# Patient Record
Sex: MALE | URBAN - METROPOLITAN AREA
[De-identification: names, ages, dates, MRNs, and addresses within clinical notes are randomized per-mention and may not be internally consistent; named-entity substitution may affect disease eponyms.]

---

## 2021-06-01 ENCOUNTER — OFFICE VISIT (OUTPATIENT)
Dept: URBAN - METROPOLITAN AREA CLINIC 92 | Facility: CLINIC | Age: 61
End: 2021-06-01

## 2021-07-22 ENCOUNTER — HOSPITAL ENCOUNTER (INPATIENT)
Dept: HOSPITAL 5 - ED | Age: 61
LOS: 2 days | Discharge: HOME | DRG: 682 | End: 2021-07-24
Attending: INTERNAL MEDICINE | Admitting: INTERNAL MEDICINE
Payer: COMMERCIAL

## 2021-07-22 DIAGNOSIS — E03.9: ICD-10-CM

## 2021-07-22 DIAGNOSIS — E66.2: ICD-10-CM

## 2021-07-22 DIAGNOSIS — I50.31: ICD-10-CM

## 2021-07-22 DIAGNOSIS — E87.8: ICD-10-CM

## 2021-07-22 DIAGNOSIS — E87.5: ICD-10-CM

## 2021-07-22 DIAGNOSIS — Z83.3: ICD-10-CM

## 2021-07-22 DIAGNOSIS — I11.0: ICD-10-CM

## 2021-07-22 DIAGNOSIS — N17.0: Primary | ICD-10-CM

## 2021-07-22 DIAGNOSIS — E87.6: ICD-10-CM

## 2021-07-22 DIAGNOSIS — E78.2: ICD-10-CM

## 2021-07-22 DIAGNOSIS — I20.8: ICD-10-CM

## 2021-07-22 DIAGNOSIS — Z82.49: ICD-10-CM

## 2021-07-22 DIAGNOSIS — Z79.899: ICD-10-CM

## 2021-07-22 DIAGNOSIS — I21.A1: ICD-10-CM

## 2021-07-22 DIAGNOSIS — E87.1: ICD-10-CM

## 2021-07-22 LAB
ALBUMIN SERPL-MCNC: 4.1 G/DL (ref 3.9–5)
ALT SERPL-CCNC: 18 UNITS/L (ref 7–56)
APTT BLD: 31 SEC. (ref 24.2–36.6)
BASOPHILS # (AUTO): 0.1 K/MM3 (ref 0–0.1)
BASOPHILS NFR BLD AUTO: 0.7 % (ref 0–1.8)
BUN SERPL-MCNC: 13 MG/DL (ref 9–20)
BUN/CREAT SERPL: 8 %
CALCIUM SERPL-MCNC: 9.2 MG/DL (ref 8.4–10.2)
EOSINOPHIL # BLD AUTO: 0.1 K/MM3 (ref 0–0.4)
EOSINOPHIL NFR BLD AUTO: 0.6 % (ref 0–4.3)
HCT VFR BLD CALC: 36.7 % (ref 35.5–45.6)
HDLC SERPL-MCNC: 48 MG/DL (ref 40–59)
HEMOLYSIS INDEX: 8
HGB BLD-MCNC: 12.8 GM/DL (ref 11.8–15.2)
INR PPP: 1.03 (ref 0.87–1.13)
LYMPHOCYTES # BLD AUTO: 1.9 K/MM3 (ref 1.2–5.4)
LYMPHOCYTES NFR BLD AUTO: 15.4 % (ref 13.4–35)
MCHC RBC AUTO-ENTMCNC: 35 % (ref 32–34)
MCV RBC AUTO: 102 FL (ref 84–94)
MONOCYTES # (AUTO): 0.8 K/MM3 (ref 0–0.8)
MONOCYTES % (AUTO): 6.8 % (ref 0–7.3)
PLATELET # BLD: 407 K/MM3 (ref 140–440)
RBC # BLD AUTO: 3.61 M/MM3 (ref 3.65–5.03)

## 2021-07-22 PROCEDURE — 71046 X-RAY EXAM CHEST 2 VIEWS: CPT

## 2021-07-22 PROCEDURE — 83880 ASSAY OF NATRIURETIC PEPTIDE: CPT

## 2021-07-22 PROCEDURE — 85730 THROMBOPLASTIN TIME PARTIAL: CPT

## 2021-07-22 PROCEDURE — 80061 LIPID PANEL: CPT

## 2021-07-22 PROCEDURE — 93005 ELECTROCARDIOGRAM TRACING: CPT

## 2021-07-22 PROCEDURE — 36415 COLL VENOUS BLD VENIPUNCTURE: CPT

## 2021-07-22 PROCEDURE — 84484 ASSAY OF TROPONIN QUANT: CPT

## 2021-07-22 PROCEDURE — 71275 CT ANGIOGRAPHY CHEST: CPT

## 2021-07-22 PROCEDURE — 93458 L HRT ARTERY/VENTRICLE ANGIO: CPT

## 2021-07-22 PROCEDURE — 96374 THER/PROPH/DIAG INJ IV PUSH: CPT

## 2021-07-22 PROCEDURE — 85379 FIBRIN DEGRADATION QUANT: CPT

## 2021-07-22 PROCEDURE — 93306 TTE W/DOPPLER COMPLETE: CPT

## 2021-07-22 PROCEDURE — 85520 HEPARIN ASSAY: CPT

## 2021-07-22 PROCEDURE — 84439 ASSAY OF FREE THYROXINE: CPT

## 2021-07-22 PROCEDURE — C1894 INTRO/SHEATH, NON-LASER: HCPCS

## 2021-07-22 PROCEDURE — 85025 COMPLETE CBC W/AUTO DIFF WBC: CPT

## 2021-07-22 PROCEDURE — 85610 PROTHROMBIN TIME: CPT

## 2021-07-22 PROCEDURE — 80048 BASIC METABOLIC PNL TOTAL CA: CPT

## 2021-07-22 PROCEDURE — 80053 COMPREHEN METABOLIC PANEL: CPT

## 2021-07-22 PROCEDURE — 84443 ASSAY THYROID STIM HORMONE: CPT

## 2021-07-22 RX ADMIN — Medication SCH ML: at 23:10

## 2021-07-22 RX ADMIN — SODIUM CHLORIDE SCH MLS/HR: 0.9 INJECTION, SOLUTION INTRAVENOUS at 23:12

## 2021-07-22 RX ADMIN — FAMOTIDINE SCH MG: 10 TABLET ORAL at 23:10

## 2021-07-22 NOTE — EMERGENCY DEPARTMENT REPORT
ED Chest Pain HPI





- General


Chief Complaint: Chest Pain


Stated Complaint: CHEST PAIN/SOB


Time Seen by Provider: 07/22/21 13:08


Source: patient


Mode of arrival: Ambulatory


Limitations: No Limitations





- History of Present Illness


Initial Comments: 





60-year-old male, history of hyperthyroidism, hypertension, presents to ED with 

chest pain, radiating to the right arm.  Patient states pain awoke him from 

sleep earlier today.  He reports associated diaphoresis, nausea, and shortness 

of breath.  States pain is aching in nature.  Patient denies any leg pain or 

swelling.  Patient states chest pain has resolved at this time.


MD Complaint: chest pain


-: This morning


Onset: during rest


Pain Location: left chest, other


Pain Radiation: RUE


Severity: moderate


Quality: aching


Consistency: intermittent


Improves With: nothing


Worsens With: nothing


re: nausea, diaphoresis, dyspnea.  denies: vomting


Other Symptoms: denies: cough, fever, leg swelling





- Related Data


                                  Previous Rx's











 Medication  Instructions  Recorded  Last Taken  Type


 


Aspirin [Aspirin BABY CHEW TAB] 81 mg PO QDAY #30 tab.chew 07/24/21 Unknown Rx


 


AtorvaSTATin [Lipitor] 20 mg PO QHS #30 tablet 07/24/21 Unknown Rx


 


ISOSORBIDE MONOnitrate [Imdur ER] 30 mg PO QDAY #30 tablet 07/24/21 Unknown Rx


 


Losartan Potassium 100 mg PO QDAY #30 07/24/21 Unknown Rx


 


Losartan [Cozaar] 100 mg PO QDAY  tablet 07/24/21 Unknown Rx


 


Metoprolol [Lopressor TAB] 50 mg PO BID #60 tablet 07/24/21 Unknown Rx


 


NIFEdipine [Nifedipine ER] 60 mg PO QDAY #30 07/24/21 Unknown Rx


 


Omeprazole 40 mg PO QAM #30 07/24/21 Unknown Rx


 


buPROPion HCl [Bupropion HCl ER] 150 mg PO BID #60 07/24/21 Unknown Rx


 


buPROPion SR [Wellbutrin SR] 150 mg PO BID  tablet 07/24/21 Unknown Rx


 


hydroCHLOROthiazide [HCTZ] 25 mg PO QDAY #30 07/24/21 Unknown Rx


 


methIMAzole [Tapazole] 5 mg PO BID #60 07/24/21 Unknown Rx











                                    Allergies











Allergy/AdvReac Type Severity Reaction Status Date / Time


 


No Known Allergies Allergy   Unverified 07/22/21 11:38














Heart Score





- HEART Score


History: Moderately suspicious


EKG: Non-specific


Age: 45-65


Risk factors: 1-2 risk factors


Troponin: 1-3x normal limit


HEART Score: 5





- EKG Read Time


Time EKG Completed: 11:47


EKG Read Time: 11:54





ED Review of Systems


ROS: 


Stated complaint: CHEST PAIN/SOB


Other details as noted in HPI





Comment: All other systems reviewed and negative


Constitutional: denies: fever


Respiratory: shortness of breath.  denies: cough


Cardiovascular: chest pain


Gastrointestinal: nausea.  denies: vomiting


Musculoskeletal: other (Denies leg pain and swelling)





ED Past Medical Hx





- Past Medical History


Hx Hypertension: Yes





- Surgical History


Past Surgical History?: No





- Medications


Home Medications: 


                                Home Medications











 Medication  Instructions  Recorded  Confirmed  Last Taken  Type


 


Aspirin [Aspirin BABY CHEW TAB] 81 mg PO QDAY #30 tab.chew 07/24/21  Unknown Rx


 


AtorvaSTATin [Lipitor] 20 mg PO QHS #30 tablet 07/24/21  Unknown Rx


 


ISOSORBIDE MONOnitrate [Imdur ER] 30 mg PO QDAY #30 tablet 07/24/21  Unknown Rx


 


Losartan Potassium 100 mg PO QDAY #30 07/24/21  Unknown Rx


 


Losartan [Cozaar] 100 mg PO QDAY  tablet 07/24/21  Unknown Rx


 


Metoprolol [Lopressor TAB] 50 mg PO BID #60 tablet 07/24/21  Unknown Rx


 


NIFEdipine [Nifedipine ER] 60 mg PO QDAY #30 07/24/21  Unknown Rx


 


Omeprazole 40 mg PO QAM #30 07/24/21  Unknown Rx


 


buPROPion HCl [Bupropion HCl ER] 150 mg PO BID #60 07/24/21  Unknown Rx


 


buPROPion SR [Wellbutrin SR] 150 mg PO BID  tablet 07/24/21  Unknown Rx


 


hydroCHLOROthiazide [HCTZ] 25 mg PO QDAY #30 07/24/21  Unknown Rx


 


methIMAzole [Tapazole] 5 mg PO BID #60 07/24/21  Unknown Rx














ED Physical Exam





- General


Limitations: No Limitations


General appearance: alert, in no apparent distress





- Head


Head exam: Present: atraumatic, normocephalic





- Eye


Eye exam: Present: normal appearance, EOMI





- ENT


ENT exam: Present: mucous membranes moist





- Neck


Neck exam: Present: normal inspection





- Respiratory


Respiratory exam: Present: normal lung sounds bilaterally.  Absent: respiratory 

distress





- Cardiovascular


Cardiovascular Exam: Present: regular rate, normal rhythm





- GI/Abdominal


GI/Abdominal exam: Present: soft.  Absent: distended, tenderness





- Extremities Exam


Extremities exam: Present: normal inspection.  Absent: pedal edema, calf 

tenderness





- Neurological Exam


Neurological exam: Present: alert, oriented X3





- Psychiatric


Psychiatric exam: Present: normal affect, normal mood





- Skin


Skin exam: Present: warm, dry, intact, normal color





ED Course


                                   Vital Signs











  07/22/21 07/22/21 07/22/21





  11:43 12:57 13:00


 


Temperature 99.4 F  


 


Pulse Rate 95 H 98 H 91 H


 


Respiratory 20 22 27 H





Rate   


 


Blood Pressure 118/74  110/79


 


O2 Sat by Pulse 99 99 96





Oximetry   














  07/22/21 07/22/21 07/22/21





  13:16 13:30 13:46


 


Temperature   


 


Pulse Rate 94 H 108 H 95 H


 


Respiratory 16 19 23





Rate   


 


Blood Pressure 110/79 103/71 110/79


 


O2 Sat by Pulse 97 97 97





Oximetry   














  07/22/21 07/22/21 07/22/21





  14:00 14:16 14:30


 


Temperature   


 


Pulse Rate 85 90 86


 


Respiratory 24 22 26 H





Rate   


 


Blood Pressure 113/72 103/71 103/71


 


O2 Sat by Pulse 95 97 97





Oximetry   














  07/22/21 07/22/21 07/22/21





  14:46 15:00 15:16


 


Temperature   


 


Pulse Rate 86 82 


 


Respiratory 16 19 





Rate   


 


Blood Pressure 126/86 126/86 126/86


 


O2 Sat by Pulse 97 95 95





Oximetry   














  07/22/21 07/22/21 07/22/21





  15:30 15:46 16:00


 


Temperature   


 


Pulse Rate   


 


Respiratory   





Rate   


 


Blood Pressure 126/86 126/86 126/86


 


O2 Sat by Pulse 95 97 95





Oximetry   














  07/22/21 07/22/21 07/22/21





  16:16 16:30 16:46


 


Temperature   


 


Pulse Rate   


 


Respiratory   





Rate   


 


Blood Pressure 126/86 126/86 126/86


 


O2 Sat by Pulse 95 95 95





Oximetry   














  07/22/21 07/22/21 07/22/21





  17:00 17:16 17:30


 


Temperature   


 


Pulse Rate   


 


Respiratory   





Rate   


 


Blood Pressure 126/86 126/86 126/86


 


O2 Sat by Pulse 95 95 96





Oximetry   














  07/22/21 07/22/21 07/22/21





  17:46 18:00 18:16


 


Temperature   


 


Pulse Rate  83 93 H


 


Respiratory  24 28 H





Rate   


 


Blood Pressure 126/86 135/85 135/85


 


O2 Sat by Pulse 94 94 94





Oximetry   














  07/22/21 07/22/21 07/22/21





  18:30 18:46 19:00


 


Temperature   


 


Pulse Rate 86 98 H 85


 


Respiratory 26 H 15 27 H





Rate   


 


Blood Pressure 135/85 135/85 130/80


 


O2 Sat by Pulse 91 100 93





Oximetry   














  07/22/21 07/22/21 07/22/21





  19:16 19:30 19:46


 


Temperature   


 


Pulse Rate 65 98 H 83


 


Respiratory 26 H 15 29 H





Rate   


 


Blood Pressure 130/80 130/80 130/80


 


O2 Sat by Pulse 96 98 94





Oximetry   














  07/22/21 07/22/21 07/22/21





  20:00 20:16 20:30


 


Temperature   


 


Pulse Rate 91 H 78 67


 


Respiratory 18 28 H 22





Rate   


 


Blood Pressure 129/87 129/87 129/87


 


O2 Sat by Pulse 97 89 87





Oximetry   














  07/22/21 07/22/21 07/22/21





  20:46 21:00 21:16


 


Temperature   


 


Pulse Rate 86 81 87


 


Respiratory 29 H 20 20





Rate   


 


Blood Pressure 129/87 101/54 101/54


 


O2 Sat by Pulse 93 96 95





Oximetry   














  07/22/21 07/22/21 07/22/21





  21:30 21:46 22:00


 


Temperature   


 


Pulse Rate 75 75 89


 


Respiratory 23 22 27 H





Rate   


 


Blood Pressure 101/54 101/54 115/78


 


O2 Sat by Pulse 95 88 92





Oximetry   














  07/22/21 07/22/21 07/22/21





  22:16 22:30 22:34


 


Temperature   


 


Pulse Rate 93 H 87 78


 


Respiratory 19 16 18





Rate   


 


Blood Pressure 115/78 115/78 115/78


 


O2 Sat by Pulse 97 98 98





Oximetry   














  07/22/21





  22:40


 


Temperature 


 


Pulse Rate 86


 


Respiratory 15





Rate 


 


Blood Pressure 115/78


 


O2 Sat by Pulse 99





Oximetry 














ED Medical Decision Making





- Lab Data


Result diagrams: 


                                 07/24/21 04:26





                                 07/24/21 04:26





- EKG Data


-: EKG Interpreted by Me


EKG shows normal: sinus rhythm, axis, intervals, QRS complexes





- EKG Data


Interpretation: nonspecific ST-T wave jennifer





- Radiology Data


Radiology results: report reviewed, image reviewed


Critical care attestation.: 


If time is entered above; I have spent that time in minutes in the direct care 

of this critically ill patient, excluding procedure time.








ED Disposition


Clinical Impression: 


 Acute chest pain, Hyponatremia, Hypokalemia, Elevated troponin





Disposition: DC-09 OP ADMIT IP TO THIS HOSP


Is pt being admited?: Yes


Condition: Stable


Time of Disposition: 15:52

## 2021-07-22 NOTE — XRAY REPORT
CHEST 2 VIEWS 



INDICATION / CLINICAL INFORMATION:

Chest pain with shortness of breath.



COMPARISON: 

None available.



FINDINGS:



SUPPORT DEVICES: None.

HEART / MEDIASTINUM: No significant abnormality. 

LUNGS / PLEURA: A calcified granuloma is noted along the right lung base measuring 8.5 mm. The lungs 
are otherwise clear. No significant pleural effusion. No pneumothorax. 



ADDITIONAL FINDINGS: No significant additional findings.



IMPRESSION:

1. No acute abnormality of the chest.



Signer Name: Daniel Rajput MD 

Signed: 7/22/2021 12:08 PM

Workstation Name: [id number]

## 2021-07-22 NOTE — HISTORY AND PHYSICAL REPORT
History of Present Illness


Chief complaint: 





My chest hurts


History of present illness: 


59 YO Male with HTN, Hypothyroidism, Obesity Hypoventilation Syndrome presents 

to ED for evaluation.  Patient reports "my chest hurts".  Patient states that he

has experienced sudden onset of chest pain that awakened him from sleep today.  

Patient states that pain is 6/10, initially intermittent but has become more 

constant, aching and crushing in nature, radiates to the left chest, worsened 

with exertion, not relieved with rest, associated with nausea, associated with 

diaphoresis, associated with shortness of breath.  Patient transported to [facility] 

via private vehicle for further care and evaluation of the aforementioned sympto

ms.  The patient was seen and evaluated in the emergency department.  All lab 

and imaging studies reviewed.  Patient found to have angina at rest, acute 

kidney injury, as well as clinical symptoms consistent with diastolic CHF, as 

well as laboratory findings consistent with type II NSTEMI.  Patient admitted to

telemetry and initiated on chest pain protocol.  Cardiology team consulted in 

ED.  Patient denies fever, chills, palpitations, productive cough, skin rash, 

recent ill contact, or known exposure to COVID-19.  No prior admission for 

review.  No medication listed at time of admission for reconciliation.  Advanced

care planning conducted in ED.








Past History


Past Medical History: hypertension, hypothyroidism


Past Surgical History: No surgical history, Other (Reviewed)


Social history: single.  denies: smoking, alcohol abuse, prescription drug abuse


Family history: diabetes, hypertension





Medications and Allergies


                                    Allergies











Allergy/AdvReac Type Severity Reaction Status Date / Time


 


No Known Allergies Allergy   Unverified 07/22/21 11:38











                                Home Medications











 Medication  Instructions  Recorded  Confirmed  Last Taken  Type


 


AtorvaSTATin [Lipitor] 20 mg PO QHS 07/22/21 07/22/21 Unknown History


 


Losartan Potassium 100 mg PO QDAY 07/22/21 07/22/21 Unknown History


 


NIFEdipine [Nifedipine ER] 60 mg PO QDAY 07/22/21 07/22/21 Unknown History


 


Omeprazole 40 mg PO QAM 07/22/21 07/22/21 Unknown History


 


buPROPion HCl [Bupropion HCl ER] 150 mg PO BID 07/22/21 07/22/21 Unknown History


 


hydroCHLOROthiazide [HCTZ] 25 mg PO QDAY 07/22/21 07/22/21 Unknown History


 


methIMAzole [Tapazole] 5 mg PO BID 07/22/21 07/22/21 Unknown History











Active Meds: 


Active Medications





Acetaminophen (Acetaminophen 325 Mg Tab)  650 mg PO Q6H PRN


   PRN Reason: Pain, Mild (1-3)


Acetaminophen (Acetaminophen 325 Mg Tab)  650 mg PO Q4H PRN


   PRN Reason: Pain MILD(1-3)/Fever >100.5/HA


Aspirin (Aspirin 81 Mg Tab Chew)  324 mg PO ONCE STA


   Stop: 07/22/21 15:45


Famotidine (Famotidine 10 Mg Tab)  10 mg PO BID BETSEY


Hydromorphone HCl (Hydromorphone 1 Mg/1 Ml Inj)  0.5 mg IV Q12H PRN


   PRN Reason: Pain , Severe (7-10)


Sodium Chloride (Nacl 0.9% 1000 Ml)  1,000 mls @ 125 mls/hr IV AS DIRECT BETSEY


Nitroglycerin (Nitroglycerin 0.4 Mg Tab Subl)  0.4 mg SL Q5M PRN


   PRN Reason: Chest Pain


Ondansetron HCl (Ondansetron 4 Mg/2 Ml Inj)  4 mg IV Q8H PRN


   PRN Reason: Nausea And Vomiting


Oxycodone/Acetaminophen (Oxycodone /Acetaminophen 5-325mg Tab)  1 tab PO Q12H 

PRN


   PRN Reason: Pain, Moderate (4-6)


Sodium Chloride (Sodium Chloride 0.9% 10 Ml Flush Syringe)  10 ml IV PRN PRN


   PRN Reason: LINE FLUSH


Sodium Chloride (Sodium Chloride 0.9% 10 Ml Flush Syringe)  10 ml IV BID BETSEY


Sodium Chloride (Sodium Chloride 0.9% 10 Ml Flush Syringe)  10 ml IV PRN PRN


   PRN Reason: LINE FLUSH


Tramadol HCl (Tramadol 50 Mg Tab)  50 mg PO Q6H PRN


   PRN Reason: Pain, Moderate (4-6)











Review of Systems


Constitutional: no weight loss, no weight gain, no fever, no chills


Ears, nose, mouth and throat: no ear pain, no ear discharge, no nasal 

congestion, no nasal discharge, no sinus pain


Cardiovascular: chest pain, shortness of breath, dyspnea on exertion, decreased 

exercise tolerance


Respiratory: no cough, no cough with sputum, no excessive sputum


Gastrointestinal: no abdominal pain, no vomiting, no diarrhea, no constipation, 

no change in bowel habits


Genitourinary Male: no hematuria, no flank pain, no discharge, no urinary 

frequency, no urinary hesitancy


Rectal: no pain, no incontinence, no bleeding


Musculoskeletal: no arm numbness/tingling, no low back pain, no shooting leg 

pain, no leg numbness/tingling


Neurological: no head injury, no transient paralysis, no weakness, no 

parathesias, no numbness, no tingling, no seizures, no syncope


Psychiatric: no anxiety, no change in sleep habits, no sleep disturbances, no 

insomnia, no change in appetite, no change in libido


Endocrine: no cold intolerance, no heat intolerance, no excessive thirst, no 

polyuria, no nocturia, no excessive sweating


Hematologic/Lymphatic: no easy bruising, no easy bleeding, no lymphadenopathy


Allergic/Immunologic: no allergic rhinitis, no wheezing, no persistent 

infections, no angioedema





Exam





- Constitutional


Vitals: 


                                        











Temp Pulse Resp BP Pulse Ox


 


 99.4 F   86   26 H  103/71   97 


 


 07/22/21 11:43  07/22/21 14:30  07/22/21 14:30  07/22/21 14:30  07/22/21 14:30











General appearance: Present: mild distress, obese





- EENT


Eyes: Present: PERRL


ENT: hearing intact, clear oral mucosa





- Neck


Neck: Present: supple, normal ROM





- Respiratory


Respiratory effort: normal


Respiratory: bilateral: CTA





- Cardiovascular


Heart Sounds: Present: S1 & S2.  Absent: rub, click





- Extremities


Extremities: pulses symmetrical, No edema


Peripheral Pulses: within normal limits





- Abdominal


General gastrointestinal: Present: soft, non-tender, non-distended, normal bowel

 sounds


Male genitourinary: Present: normal





- Integumentary


Integumentary: Present: clear, warm, dry





- Musculoskeletal


Musculoskeletal: gait normal, strength equal bilaterally





- Psychiatric


Psychiatric: appropriate mood/affect, intact judgment & insight





- Neurologic


Neurologic: CNII-XII intact, moves all extremities





HEART Score





- HEART Score


EKG: Non-specific


Age: 45-65


Risk factors: 1-2 risk factors


Troponin: 


                                        











Troponin T  0.082 ng/mL (0.00-0.029)  H D 07/22/21  14:15    











Troponin: 1-3x normal limit





Results





- Labs


CBC & Chem 7: 


                                 07/22/21 12:45





                                 07/22/21 12:45


Labs: 


                              Abnormal lab results











  07/22/21 07/22/21 07/22/21 Range/Units





  12:45 12:45 14:15 


 


WBC  12.2 H    (4.5-11.0)  K/mm3


 


RBC  3.61 L    (3.65-5.03)  M/mm3


 


MCV  102 H    (84-94)  fl


 


MCH  36 H    (28-32)  pg


 


MCHC  35 H    (32-34)  %


 


Seg Neutrophils %  76.5 H    (40.0-70.0)  %


 


Seg Neutrophils #  9.4 H    (1.8-7.7)  K/mm3


 


D-Dimer     (0-234)  ng/mlDDU


 


Sodium   124 L   (137-145)  mmol/L


 


Potassium   3.1 L   (3.6-5.0)  mmol/L


 


Chloride   80.4 L   ()  mmol/L


 


Creatinine   1.6 H   (0.8-1.3)  mg/dL


 


Troponin T   0.056 H  0.082 H D  (0.00-0.029)  ng/mL


 


Triglycerides   267 H   (2-149)  mg/dL


 


Cholesterol   212 H   ()  mg/dL


 


LDL Cholesterol Direct   140 H   ()  mg/dL














  07/22/21 Range/Units





  14:15 


 


WBC   (4.5-11.0)  K/mm3


 


RBC   (3.65-5.03)  M/mm3


 


MCV   (84-94)  fl


 


MCH   (28-32)  pg


 


MCHC   (32-34)  %


 


Seg Neutrophils %   (40.0-70.0)  %


 


Seg Neutrophils #   (1.8-7.7)  K/mm3


 


D-Dimer  1805.96 H  (0-234)  ng/mlDDU


 


Sodium   (137-145)  mmol/L


 


Potassium   (3.6-5.0)  mmol/L


 


Chloride   ()  mmol/L


 


Creatinine   (0.8-1.3)  mg/dL


 


Troponin T   (0.00-0.029)  ng/mL


 


Triglycerides   (2-149)  mg/dL


 


Cholesterol   ()  mg/dL


 


LDL Cholesterol Direct   ()  mg/dL














Assessment and Plan





- Patient Problems


(1) Angina at rest


Current Visit: Yes   Status: Acute   


Plan to address problem: 


Chest pain protocol: Serial cardiac enzymes, EKG, telemetry, morphine, submental

 oxygen, nitro, aspirin, cardiology team consulted in ED.








(2) ISABELA (acute kidney injury)


Current Visit: Yes   Status: Acute   


Plan to address problem: 


BMP, IV fluid resuscitation therapy, repeat BMP in a.m. to monitor serum 

creatinine as well as GFR.








(3) Diastolic CHF


Current Visit: Yes   Status: Acute   


Qualifiers: 


   Heart failure chronicity: acute   Qualified Code(s): I50.31 - Acute diastolic

 (congestive) heart failure   


Plan to address problem: 


Strict I/O, monitor urine output every shift, daily weight, afterload reduction,

 blood pressure control, echocardiogram ordered and is pending at time of 

admission.








(4) NSTEMI (non-ST elevated myocardial infarction)


Current Visit: Yes   Status: Acute   


Plan to address problem: 


Type II NSTEMI: Patient initiated on therapeutic anticoagulation in the 

emergency department, cardiology consulted in ED.








(5) Obesity hypoventilation syndrome


Current Visit: Yes   Status: Acute   


Plan to address problem: 


Balanced diet, increase physical activity at discharge, outpatient pulmonary 

follow-up for sleep study.








(6) DVT prophylaxis


Current Visit: Yes   Status: Acute   


Plan to address problem: 


SCD to bilateral lower extremities while in bed, therapeutic anticoagulation at 

this time.








(7) Advance care planning


Current Visit: Yes   Status: Acute   


Plan to address problem: 


Disease education conducted, care plan discussed, diagnosis, discussed, 

prognosis discussed, patient is full code, patient knowledges understanding and 

agreement with care plan, +30 minutes.

## 2021-07-22 NOTE — ELECTROCARDIOGRAPH REPORT
[facility]

                                       

Test Date:    2021               Test Time:    11:47:27

Pat Name:     HINA FLETCHER            Department:   

Patient ID:   [id number]          Room:          

Gender:       M                        Technician:   JUAREZ

:          1960               Requested By: ED DOC

Order Number: [id number]              Reading MD:   Cristino Wells

                                 Measurements

Intervals                              Axis          

Rate:         96                       P:            37

MO:           164                      QRS:          -10

QRSD:         104                      T:            30

QT:           389                                    

QTc:          489                                    

                           Interpretive Statements

Sinus rhythm

Atrial premature complex

Probable left atrial enlargement

Borderline ST depression, anterolateral leads

No previous ECG available for comparison

Electronically Signed On 2021 12:41:05 EDT by Cristino Wells

## 2021-07-22 NOTE — CAT SCAN REPORT
CTA CHEST WITH CONTRAST



INDICATION / CLINICAL INFORMATION: dyspnea, X1DAY [id number] 100ML.



TECHNIQUE: Axial CT images were obtained through the chest after injection of IV contrast. 3 plane MI
P and/or 3D reconstructions were produced. All CT scans at this location are performed using CT dose 
reduction for ALARA by means of automated exposure control. 



COMPARISON: None available.



FINDINGS:

PULMONARY ARTERIES: No pulmonary emboli within the central and segmental pulmonary arteries. There is
 motion artifact limiting evaluation of the lower lobe subsegmental branches.

THORACIC AORTA: No significant abnormality. 

HEART: No significant abnormality.

CORONARY ARTERY CALCIFICATION: None.

MEDIASTINUM / URSULA: No significant abnormality.

PLEURA: No pleural effusion. No pneumothorax.

LUNGS: No acute air space or interstitial disease. There is groundglass opacification within the righ
t lower lobe, likely representing atelectasis.



ADDITIONAL FINDINGS: There is mucoid material within the right mainstem bronchus.



UPPER ABDOMEN: There is a patulous esophagus.



SKELETAL STRUCTURES: No significant osseous abnormality.



IMPRESSION:

1. No CT evidence for central or segmental pulmonary embolism. The subsegmental arteries are limited 
secondary to patient motion.

2. No acute findings. 

3. Patulous esophagus.

4. Mucoid material within the right mainstem bronchus which may aspirated material, correlate clinica
lly.



Signer Name: Kike Mai DO 

Signed: 7/22/2021 5:34 PM

Workstation Name: [id number]

## 2021-07-23 LAB
BUN SERPL-MCNC: 11 MG/DL (ref 9–20)
BUN/CREAT SERPL: 8 %
CALCIUM SERPL-MCNC: 8.1 MG/DL (ref 8.4–10.2)
HEMOLYSIS INDEX: 10

## 2021-07-23 PROCEDURE — 4A023N7 MEASUREMENT OF CARDIAC SAMPLING AND PRESSURE, LEFT HEART, PERCUTANEOUS APPROACH: ICD-10-PCS | Performed by: INTERNAL MEDICINE

## 2021-07-23 PROCEDURE — B211YZZ FLUOROSCOPY OF MULTIPLE CORONARY ARTERIES USING OTHER CONTRAST: ICD-10-PCS | Performed by: INTERNAL MEDICINE

## 2021-07-23 PROCEDURE — B215YZZ FLUOROSCOPY OF LEFT HEART USING OTHER CONTRAST: ICD-10-PCS | Performed by: INTERNAL MEDICINE

## 2021-07-23 RX ADMIN — VERAPAMIL HYDROCHLORIDE ONE MG: 2.5 INJECTION INTRAVENOUS at 11:18

## 2021-07-23 RX ADMIN — FENTANYL CITRATE ONE MCG: 50 INJECTION, SOLUTION INTRAMUSCULAR; INTRAVENOUS at 11:25

## 2021-07-23 RX ADMIN — NITROGLYCERIN ONE MLS: 20 INJECTION INTRAVENOUS at 11:19

## 2021-07-23 RX ADMIN — NITROGLYCERIN ONE MLS: 20 INJECTION INTRAVENOUS at 11:28

## 2021-07-23 RX ADMIN — VERAPAMIL HYDROCHLORIDE ONE MG: 2.5 INJECTION INTRAVENOUS at 11:28

## 2021-07-23 RX ADMIN — LIDOCAINE HYDROCHLORIDE ONE ML: 20 INJECTION, SOLUTION INFILTRATION; PERINEURAL at 11:17

## 2021-07-23 RX ADMIN — LIDOCAINE HYDROCHLORIDE ONE ML: 20 INJECTION, SOLUTION INFILTRATION; PERINEURAL at 11:26

## 2021-07-23 RX ADMIN — METOPROLOL TARTRATE SCH MG: 25 TABLET, FILM COATED ORAL at 21:41

## 2021-07-23 RX ADMIN — MIDAZOLAM ONE MG: 1 INJECTION INTRAMUSCULAR; INTRAVENOUS at 11:25

## 2021-07-23 RX ADMIN — FAMOTIDINE SCH MG: 10 TABLET ORAL at 14:29

## 2021-07-23 RX ADMIN — FAMOTIDINE SCH MG: 10 TABLET ORAL at 21:41

## 2021-07-23 RX ADMIN — Medication SCH ML: at 21:42

## 2021-07-23 RX ADMIN — HEPARIN SODIUM ONE UNIT: 1000 INJECTION, SOLUTION INTRAVENOUS; SUBCUTANEOUS at 11:28

## 2021-07-23 RX ADMIN — HEPARIN SODIUM ONE UNIT: 1000 INJECTION, SOLUTION INTRAVENOUS; SUBCUTANEOUS at 11:17

## 2021-07-23 RX ADMIN — SODIUM CHLORIDE SCH MLS/HR: 0.9 INJECTION, SOLUTION INTRAVENOUS at 06:33

## 2021-07-23 RX ADMIN — MIDAZOLAM ONE MG: 1 INJECTION INTRAMUSCULAR; INTRAVENOUS at 11:16

## 2021-07-23 RX ADMIN — FENTANYL CITRATE ONE MCG: 50 INJECTION, SOLUTION INTRAMUSCULAR; INTRAVENOUS at 11:16

## 2021-07-23 RX ADMIN — Medication SCH ML: at 14:29

## 2021-07-23 NOTE — ELECTROCARDIOGRAPH REPORT
[facility]

                                       

Test Date:    2021               Test Time:    08:12:21

Pat Name:     HINA FLETCHER            Department:   

Patient ID:   [id number]          Room:         A477 1

Gender:       M                        Technician:   SILVERIO

:          1960               Requested By: MANDI ACHARYA

Order Number: [id number]              Reading MD:   Cristino Wells

                                 Measurements

Intervals                              Axis          

Rate:         93                       P:            41

DC:           170                      QRS:          -32

QRSD:         101                      T:            30

QT:           456                                    

QTc:          568                                    

                           Interpretive Statements

Sinus rhythm

Multiple premature complexes, vent & supraven

Left axis deviation

Prolonged QT interval

Compared to ECG 2021 11:47:27

No significant change

Electronically Signed On 2021 14:13:07 EDT by Cristino Wells

## 2021-07-23 NOTE — CONSULTATION
History of Present Illness


Consult date: 07/23/21


Requesting physician: PHILIPPE ARTEAGA


Consult reason: chest pain, elevated troponin, shortness of breath


History of present illness: 


60-year-old Afro-American male with history of hypertension hyperlipidemia 

obesity hypoventilation syndrome hypothyroidism for several months been having 

shortness of breath with exertion with PND.  Tried diuretics by primary care 

doctor without relief.  Been having fatigue with exertion.  And had chest pain 

yesterday midsternal nonradiating abnormal troponin suggestive of non-STEMI 

patient was admitted found a mild renal sufficiency with hyponatremia.  With 

hyperkalemia.  Given patient symptomology patient was taken to the cardiac Cath 

Lab which revealed left main patent LAD patent circumflex patent ramus patent 

RCA patent small PLV with an 80% lesion with normal LV function with normal left

end-diastolic pressure.  Patient denies any fever chills nausea vomiting or 

syncope








Past History


Past Medical History: hypertension, hyperlipidemia, hypothyroidism


Past Surgical History: No surgical history, Other (Reviewed)


Social history: single.  denies: smoking, alcohol abuse, prescription drug abuse


Family history: diabetes, hypertension





Medications and Allergies


                                    Allergies











Allergy/AdvReac Type Severity Reaction Status Date / Time


 


No Known Allergies Allergy   Unverified 07/22/21 11:38











                                Home Medications











 Medication  Instructions  Recorded  Confirmed  Last Taken  Type


 


AtorvaSTATin [Lipitor] 20 mg PO QHS 07/22/21 07/22/21 Unknown History


 


Losartan Potassium 100 mg PO QDAY 07/22/21 07/22/21 Unknown History


 


NIFEdipine [Nifedipine ER] 60 mg PO QDAY 07/22/21 07/22/21 Unknown History


 


Omeprazole 40 mg PO QAM 07/22/21 07/22/21 Unknown History


 


buPROPion HCl [Bupropion HCl ER] 150 mg PO BID 07/22/21 07/22/21 Unknown History


 


hydroCHLOROthiazide [HCTZ] 25 mg PO QDAY 07/22/21 07/22/21 Unknown History


 


methIMAzole [Tapazole] 5 mg PO BID 07/22/21 07/22/21 Unknown History











Active Meds: 


Active Medications





Acetaminophen (Acetaminophen 325 Mg Tab)  650 mg PO Q4H PRN


   PRN Reason: Fever >100.5/HA


Aspirin (Aspirin 81 Mg Tab Chew)  81 mg PO QDAY [facility]SEY


Atorvastatin Calcium (Atorvastatin 20 Mg Tab)  20 mg PO QHS [facility]SEY


Famotidine (Famotidine 10 Mg Tab)  10 mg PO BID [facility]


   Last Admin: 07/22/21 23:10 Dose:  10 mg


   Documented by: 


Sodium Chloride (Nacl 0.9% 500 Ml)  500 mls @ 50 mls/hr IV AS DIRECT [facility]


   Last Admin: 07/23/21 10:34 Dose:  50 mls/hr


   Documented by: 


Isosorbide Mononitrate (Isosorbide Mononitrate Er 30 Mg Tab)  30 mg PO QDAY [facility]


Methimazole (Methimazole 5 Mg Tab)  5 mg PO BID [facility]


Metoprolol Tartrate (Metoprolol Tartrate 25 Mg Tab)  25 mg PO BID [facility]


Miscellaneous Medication (Bupropion Hcl [Bupropion Hcl Er])  150 mg PO BID [facility]


Miscellaneous Medication (Losartan Potassium [Losartan Potassium])  100 mg PO 

QDAY [facility]


Miscellaneous Medication (Omeprazole [Omeprazole])  40 mg PO QAM [facility]


Nitroglycerin (Nitroglycerin 0.4 Mg Tab Subl)  0.4 mg SL Q5M PRN


   PRN Reason: Chest Pain


Ondansetron HCl (Ondansetron 4 Mg/2 Ml Inj)  4 mg IV Q8H PRN


   PRN Reason: Nausea And Vomiting


Oxycodone/Acetaminophen (Oxycodone /Acetaminophen 5-325mg Tab)  1 tab PO Q12H 

PRN


   PRN Reason: Pain, Moderate (4-6)


Sodium Chloride (Sodium Chloride 0.9% 10 Ml Flush Syringe)  10 ml IV BID [facility]


   Last Admin: 07/22/21 23:10 Dose:  10 ml


   Documented by: 


Sodium Chloride (Sodium Chloride 0.9% 10 Ml Flush Syringe)  10 ml IV PRN PRN


   PRN Reason: LINE FLUSH


Tramadol HCl (Tramadol 50 Mg Tab)  50 mg PO Q4H PRN


   PRN Reason: Pain, Mild (1-3)











Review of Systems


All systems: negative (as per hpi)





Physical Examination


                                   Vital Signs











Temp Pulse Resp BP Pulse Ox


 


 99.4 F   95 H  20   118/74   99 


 


 07/22/21 11:43  07/22/21 11:43  07/22/21 11:43  07/22/21 11:43  07/22/21 11:43











General appearance: no acute distress, well-nourished


HEENT: Positive: PERRL, Mucus Membranes Moist


Neck: Positive: neck supple, trachea midline


Cardiac: Positive: Reg Rate and Rhythm, S1/S2.  Negative: Audible Murmur


Lungs: Positive: clear to auscultation, Normal Breath Sounds


Neuro: Positive: Grossly Intact


Abdomen: Positive: Soft, Active Bowel Sounds.  Negative: Tender, Distended


Male genitourinary: Positive: normal


Skin: Positive: Clear


Incision: Cardiac Cath Site


Musculoskeletal: No Pain, Normal Range of Motion


Extremities: Present: normal.  Absent: edema





Results





                                 07/22/21 12:45





                                 07/23/21 05:25


                                 Cardiac Enzymes











  07/22/21 Range/Units





  12:45 


 


AST  25  (5-40)  units/L








                                   Coagulation











  07/22/21 Range/Units





  14:15 


 


PT  14.1  (12.2-14.9)  Sec.


 


INR  1.03  (0.87-1.13)  


 


APTT  31.0  (24.2-36.6)  Sec.








                                     Lipids











  07/22/21 Range/Units





  12:45 


 


Triglycerides  267 H  (2-149)  mg/dL


 


Cholesterol  212 H  ()  mg/dL


 


HDL Cholesterol  48  (40-59)  mg/dL


 


Cholesterol/HDL Ratio  4.41  %








                                       CBC











  07/22/21 Range/Units





  12:45 


 


WBC  12.2 H  (4.5-11.0)  K/mm3


 


RBC  3.61 L  (3.65-5.03)  M/mm3


 


Hgb  12.8  (11.8-15.2)  gm/dl


 


Hct  36.7  (35.5-45.6)  %


 


Plt Count  407  (140-440)  K/mm3


 


Lymph # (Auto)  1.9  (1.2-5.4)  K/mm3


 


Mono # (Auto)  0.8  (0.0-0.8)  K/mm3


 


Eos # (Auto)  0.1  (0.0-0.4)  K/mm3


 


Baso # (Auto)  0.1  (0.0-0.1)  K/mm3








                          Comprehensive Metabolic Panel











  07/22/21 07/23/21 Range/Units





  12:45 05:25 


 


Sodium  124 L  124 L  (137-145)  mmol/L


 


Potassium  3.1 L  3.2 L  (3.6-5.0)  mmol/L


 


Chloride  80.4 L  85.3 L  ()  mmol/L


 


Carbon Dioxide  30  25  (22-30)  mmol/L


 


BUN  13  11  (9-20)  mg/dL


 


Creatinine  1.6 H  1.3  (0.8-1.3)  mg/dL


 


Glucose  100  93  ()  mg/dL


 


Calcium  9.2  8.1 L  (8.4-10.2)  mg/dL


 


AST  25   (5-40)  units/L


 


ALT  18   (7-56)  units/L


 


Alkaline Phosphatase  57   ()  units/L


 


Total Protein  7.2   (6.3-8.2)  g/dL


 


Albumin  4.1   (3.9-5)  g/dL














- Imaging and Cardiology


Cardiac cath: report reviewed (left main patent LAD patent circumflex patent 

ramus patent RCA patent small PLV with an 80% lesion with normal LV function 

with normal left end-diastolic pressure.)





EKG interpretations





- Telemetry


EKG Rhythm: Sinus Rhythm (Normal sinus rhythm nonspecific ST-T)





Assessment and Plan


60-year-old male with hypertension hyperlipidemia obesity hypoventilation 

syndrome PND orthopnea chest pain non-STEMI cardiac cath revealed nonobstructive

 disease with normal LV function normal left end-diastolic pressure stop 

hydrochlorothiazide continue losartan stop nifedipine start beta-blocker Imdur 

aspirin and statin.  Advised for pulmonary consult.  And echocardiogram for 

valvular heart disease.








- Patient Problems


(1) Hypertension


Current Visit: Yes   Status: Chronic   


Qualifiers: 


   Hypertension type: primary hypertension   Qualified Code(s): I10 - Essential 

(primary) hypertension   





(2) Hyperlipemia, mixed


Current Visit: Yes   Status: Chronic   





(3) ISABELA (acute kidney injury)


Current Visit: Yes   Status: Acute   





(4) Angina at rest


Current Visit: Yes   Status: Acute   





(5) Diastolic CHF


Current Visit: Yes   Status: Acute   


Qualifiers: 


   Heart failure chronicity: acute   Qualified Code(s): I50.31 - Acute diastolic

 (congestive) heart failure   





(6) Hypokalemia


Current Visit: Yes   Status: Acute   





(7) Hyponatremia


Current Visit: Yes   Status: Acute   





(8) NSTEMI (non-ST elevated myocardial infarction)


Current Visit: Yes   Status: Acute   





(9) Obesity hypoventilation syndrome


Current Visit: Yes   Status: Chronic

## 2021-07-23 NOTE — CARDIAC CATHERIZATION REPORT
DATE OF SERVICE: 07/23/2021



HEART CATHETERIZATION



CLINICAL INFORMATION:  This is a 60-year-old male who presents with chest pain. 

He has been having chronic shortness of breath, inability to lay down flat.  

Abnormal troponin suggestive of non-STEMI, is here for left heart 

catheterization.



PROCEDURE PERFORMED:  Moderate sedation started at 11:25, finished at 11:35, 10 

minutes of moderate sedation and supervised.



DESCRIPTION OF PROCEDURE:  Procedure was done via the right radial artery, 

sterile technique, local anesthesia, 6-Wallisian radial sheath inserted.  Left 

system engaged JL3.5 catheter.  Left main is large and patent, bifurcates into a

large LAD,  LAD is patent.  Diagonal 1 diagonal 2 are small caliber, was 

patent.  Ramus is a small to medium caliber vessel and the upper and lower 

branches are patent.  Circumflex, large caliber vessel, bifurcates into a large 

OM1 that is patent.  OM2 is a small caliber vessel that is patent. Native 

circumflex becomes small to medium caliber and patent.  RCA is a large dominant 

vessel, engaged JR4, patent from proximally and distally.  PDA is small to 

medium caliber and patent.  PLV is a small caliber vessel, less than 2 mm 

vessel, proximal 80% lesion.  LV gram done in [place] and [place] shows normal LV 

function,ef 55-60%, LVEDP of 19 mmHg, LV is 134, aortic is 134/84.  No 

gradient across the aortic valve on pullback.  The 5-Wallisian catheters all taken 

over a guidewire, 6-Wallisian radial sheath was discontinued.  Radial band applied.

 No hematoma, no bleeding.



SUMMARY:  Left main patent, LAD patent, circumflex patent, ramus patent, RCA 

patent, PDA patent, PLV 80%, small vessel.  Treat medically with normal LV 

function.  The patient was chest pain free.  Treat medically.  Unclear etiology 

of his shortness of breath and given normal LV function and patent epicardial 

vessels.







DD: 07/23/2021 11:43 AM

DT: 07/23/2021 01:41 PM

TID: [id number] RECEIPT: [id number]

JOSH/GRETCHEN/GABY CORREIA

## 2021-07-23 NOTE — PROGRESS NOTE
Assessment and Plan


Assessment and plan: 





Chest pain.





NSTEMI





Acute kidney injury





Hyponatremia.





Obesity hypoventilation syndrome





7/23/2021.  Patient does not show any evidence of diastolic heart failure.  

Patient has a normal BNP and chest x-ray unremarkable.  Cardiology to perform 

cardiac catheterization for ischemic evaluation of chest pain.  If cardiac 

catheterization negative, we will anticipate discharge in a.m. Continue IV 

fluids and follow-up BMP in the morning for hyponatremia.





History


Interval history: 





No new issues overnight.





Hospitalist Physical





- Constitutional


Vitals: 


                                        











Temp Pulse Resp BP Pulse Ox


 


 97.2 F L  87   17   150/94   97 


 


 07/23/21 07:59  07/23/21 08:00  07/23/21 08:00  07/23/21 07:59  07/23/21 08:00











General appearance: Present: no acute distress, obese





- EENT


Eyes: Present: PERRL, EOM intact


ENT: hearing intact, clear oral mucosa, dentition normal





- Neck


Neck: Present: supple, normal ROM





- Respiratory


Respiratory effort: normal


Respiratory: bilateral: CTA





- Cardiovascular


Rhythm: regular


Heart Sounds: Present: S1 & S2.  Absent: gallop, rub





- Extremities


Extremities: no ischemia, No edema, Full ROM





- Abdominal


General gastrointestinal: soft, non-tender, non-distended, normal bowel sounds





- Integumentary


Integumentary: Present: clear, warm, dry





- Neurologic


Neurologic: CNII-XII intact, moves all extremities





HEART Score





- HEART Score


EKG: Non-specific


Age: 45-65


Risk factors: 1-2 risk factors


Troponin: 


                                        











Troponin T  0.121 ng/mL (0.00-0.029)  H*  07/23/21  05:25    











Troponin: 1-3x normal limit





Results





- Labs


CBC & Chem 7: 


                                 07/22/21 12:45





                                 07/23/21 05:25


Labs: 


                             Laboratory Last Values











WBC  12.2 K/mm3 (4.5-11.0)  H  07/22/21  12:45    


 


RBC  3.61 M/mm3 (3.65-5.03)  L  07/22/21  12:45    


 


Hgb  12.8 gm/dl (11.8-15.2)   07/22/21  12:45    


 


Hct  36.7 % (35.5-45.6)   07/22/21  12:45    


 


MCV  102 fl (84-94)  H  07/22/21  12:45    


 


MCH  36 pg (28-32)  H  07/22/21  12:45    


 


MCHC  35 % (32-34)  H  07/22/21  12:45    


 


RDW  13.8 % (13.2-15.2)   07/22/21  12:45    


 


Plt Count  407 K/mm3 (140-440)   07/22/21  12:45    


 


Lymph % (Auto)  15.4 % (13.4-35.0)   07/22/21  12:45    


 


Mono % (Auto)  6.8 % (0.0-7.3)   07/22/21  12:45    


 


Eos % (Auto)  0.6 % (0.0-4.3)   07/22/21  12:45    


 


Baso % (Auto)  0.7 % (0.0-1.8)   07/22/21  12:45    


 


Lymph # (Auto)  1.9 K/mm3 (1.2-5.4)   07/22/21  12:45    


 


Mono # (Auto)  0.8 K/mm3 (0.0-0.8)   07/22/21  12:45    


 


Eos # (Auto)  0.1 K/mm3 (0.0-0.4)   07/22/21  12:45    


 


Baso # (Auto)  0.1 K/mm3 (0.0-0.1)   07/22/21  12:45    


 


Seg Neutrophils %  76.5 % (40.0-70.0)  H  07/22/21  12:45    


 


Seg Neutrophils #  9.4 K/mm3 (1.8-7.7)  H  07/22/21  12:45    


 


PT  14.1 Sec. (12.2-14.9)   07/22/21  14:15    


 


INR  1.03  (0.87-1.13)   07/22/21  14:15    


 


APTT  31.0 Sec. (24.2-36.6)   07/22/21  14:15    


 


D-Dimer  1805.96 ng/mlDDU (0-234)  H  07/22/21  14:15    


 


Heparin Anti-Xa Level  0.11 U.I./ml (0.3-0.7)  L  07/23/21  05:25    


 


Sodium  124 mmol/L (137-145)  L  07/23/21  05:25    


 


Potassium  3.2 mmol/L (3.6-5.0)  L  07/23/21  05:25    


 


Chloride  85.3 mmol/L ()  L  07/23/21  05:25    


 


Carbon Dioxide  25 mmol/L (22-30)   07/23/21  05:25    


 


Anion Gap  17 mmol/L  07/23/21  05:25    


 


BUN  11 mg/dL (9-20)   07/23/21  05:25    


 


Creatinine  1.3 mg/dL (0.8-1.3)   07/23/21  05:25    


 


Estimated GFR  > 60 ml/min  07/23/21  05:25    


 


BUN/Creatinine Ratio  8 %  07/23/21  05:25    


 


Glucose  93 mg/dL ()   07/23/21  05:25    


 


Calcium  8.1 mg/dL (8.4-10.2)  L  07/23/21  05:25    


 


Total Bilirubin  0.80 mg/dL (0.1-1.2)   07/22/21  12:45    


 


AST  25 units/L (5-40)   07/22/21  12:45    


 


ALT  18 units/L (7-56)   07/22/21  12:45    


 


Alkaline Phosphatase  57 units/L ()   07/22/21  12:45    


 


Troponin T  0.121 ng/mL (0.00-0.029)  H*  07/23/21  05:25    


 


NT-Pro-B Natriuret Pep  124.3 pg/mL (0-900)   07/22/21  15:57    


 


Total Protein  7.2 g/dL (6.3-8.2)   07/22/21  12:45    


 


Albumin  4.1 g/dL (3.9-5)   07/22/21  12:45    


 


Albumin/Globulin Ratio  1.3 %  07/22/21  12:45    


 


Triglycerides  267 mg/dL (2-149)  H  07/22/21  12:45    


 


Cholesterol  212 mg/dL ()  H  07/22/21  12:45    


 


LDL Cholesterol Direct  140 mg/dL ()  H  07/22/21  12:45    


 


HDL Cholesterol  48 mg/dL (40-59)   07/22/21  12:45    


 


Cholesterol/HDL Ratio  4.41 %  07/22/21  12:45    











Martinez/IV: 


                                        





Voiding Method                   Urinal











Active Medications





- Current Medications


Current Medications: 














Generic Name Dose Route Start Last Admin





  Trade Name Clementina  PRN Reason Stop Dose Admin


 


Acetaminophen  650 mg  07/22/21 15:44 





  Acetaminophen 325 Mg Tab  PO  





  Q4H PRN  





  Pain MILD(1-3)/Fever >100.5/HA  


 


Famotidine  10 mg  07/22/21 22:00  07/22/21 23:10





  Famotidine 10 Mg Tab  PO   10 mg





  BID BETSEY   Administration


 


Hydromorphone HCl  0.5 mg  07/22/21 15:44 





  Hydromorphone 1 Mg/1 Ml Inj  IV  





  Q12H PRN  





  Pain , Severe (7-10)  


 


Sodium Chloride  1,000 mls @ 125 mls/hr  07/22/21 16:00  07/23/21 06:33





  Nacl 0.9% 1000 Ml  IV   125 mls/hr





  AS DIRECT BETSEY   Administration


 


Heparin Sodium/Sodium Chloride  25,000 unit in 500 mls @ 20 mls/hr  07/22/21 

23:00  07/23/21 06:29





  Heparin/ 0.45% Nacl-25,000 Unit/500 Ml  IV   1,200 units/hr





  TITRATE BETSEY   24 mls/hr





    Titration





  Protocol  





  1,000 UNITS/HR  


 


Sodium Chloride  500 mls @ 50 mls/hr  07/23/21 11:00  07/23/21 10:34





  Nacl 0.9% 500 Ml  IV   50 mls/hr





  AS DIRECT BETSEY   Administration


 


Nitroglycerin  0.4 mg  07/22/21 15:44 





  Nitroglycerin 0.4 Mg Tab Subl  SL  





  Q5M PRN  





  Chest Pain  


 


Ondansetron HCl  4 mg  07/22/21 15:44 





  Ondansetron 4 Mg/2 Ml Inj  IV  





  Q8H PRN  





  Nausea And Vomiting  


 


Oxycodone/Acetaminophen  1 tab  07/22/21 15:44 





  Oxycodone /Acetaminophen 5-325mg Tab  PO  





  Q12H PRN  





  Pain, Moderate (4-6)  


 


Sodium Chloride  10 ml  07/22/21 22:00  07/22/21 23:10





  Sodium Chloride 0.9% 10 Ml Flush Syringe  IV   10 ml





  BID BETSEY   Administration


 


Sodium Chloride  10 ml  07/22/21 15:44 





  Sodium Chloride 0.9% 10 Ml Flush Syringe  IV  





  PRN PRN  





  LINE FLUSH  


 


Tramadol HCl  50 mg  07/22/21 15:44 





  Tramadol 50 Mg Tab  PO  





  Q6H PRN  





  Pain, Moderate (4-6)

## 2021-07-23 NOTE — CONSULTATION
History of Present Illness


Consult date: 07/23/21


Requesting physician: RADAMES MOSQUEDA


Reason for consult: dyspnea


History of present illness: 





61 y/o male admitted for chest pain with positive troponin.  Shortness of breath

has been present for several months and chest pain is what brought him to the 

ED.  Cathed this am and currently still in recovery.  Per Cards consult, had no 

obstructing lesions with normal EF and normal LVEDP.  Cards requested consult 

for PND and orthopnea.





Past History


Past Medical History: hypertension, hyperlipidemia, hypothyroidism


Past Surgical History: No surgical history, Other (Reviewed)


Social history: single.  denies: smoking, alcohol abuse, prescription drug abuse


Family history: diabetes, hypertension





Medications and Allergies


                                    Allergies











Allergy/AdvReac Type Severity Reaction Status Date / Time


 


No Known Allergies Allergy   Unverified 07/22/21 11:38











                                Home Medications











 Medication  Instructions  Recorded  Confirmed  Last Taken  Type


 


AtorvaSTATin [Lipitor] 20 mg PO QHS 07/22/21 07/22/21 Unknown History


 


Losartan Potassium 100 mg PO QDAY 07/22/21 07/22/21 Unknown History


 


NIFEdipine [Nifedipine ER] 60 mg PO QDAY 07/22/21 07/22/21 Unknown History


 


Omeprazole 40 mg PO QAM 07/22/21 07/22/21 Unknown History


 


buPROPion HCl [Bupropion HCl ER] 150 mg PO BID 07/22/21 07/22/21 Unknown History


 


hydroCHLOROthiazide [HCTZ] 25 mg PO QDAY 07/22/21 07/22/21 Unknown History


 


methIMAzole [Tapazole] 5 mg PO BID 07/22/21 07/22/21 Unknown History











Active Meds: 


Active Medications





Acetaminophen (Acetaminophen 325 Mg Tab)  650 mg PO Q4H PRN


   PRN Reason: Fever >100.5/HA


Aspirin (Aspirin 81 Mg Tab Chew)  81 mg PO QDAY BETSEY


Atorvastatin Calcium (Atorvastatin 20 Mg Tab)  20 mg PO QHS BETSEY


Bupropion HCl (Bupropion Sr 150 Mg Tab)  150 mg PO BID BETSEY


Famotidine (Famotidine 10 Mg Tab)  10 mg PO BID [facility]


   Last Admin: 07/22/21 23:10 Dose:  10 mg


   Documented by: 


Sodium Chloride (Nacl 0.9% 500 Ml)  500 mls @ 50 mls/hr IV AS DIRECT BETSEY


   Last Admin: 07/23/21 10:34 Dose:  50 mls/hr


   Documented by: 


Isosorbide Mononitrate (Isosorbide Mononitrate Er 30 Mg Tab)  30 mg PO QDAY BETSEY


Losartan Potassium (Losartan 50 Mg Tab)  100 mg PO QDAY BETSEY


Methimazole (Methimazole 5 Mg Tab)  5 mg PO BID BETSEY


Metoprolol Tartrate (Metoprolol Tartrate 25 Mg Tab)  25 mg PO BID [facility]


Nitroglycerin (Nitroglycerin 0.4 Mg Tab Subl)  0.4 mg SL Q5M PRN


   PRN Reason: Chest Pain


Ondansetron HCl (Ondansetron 4 Mg/2 Ml Inj)  4 mg IV Q8H PRN


   PRN Reason: Nausea And Vomiting


Oxycodone/Acetaminophen (Oxycodone /Acetaminophen 5-325mg Tab)  1 tab PO Q12H 

PRN


   PRN Reason: Pain, Moderate (4-6)


Sodium Chloride (Sodium Chloride 0.9% 10 Ml Flush Syringe)  10 ml IV BID [facility]


   Last Admin: 07/22/21 23:10 Dose:  10 ml


   Documented by: 


Sodium Chloride (Sodium Chloride 0.9% 10 Ml Flush Syringe)  10 ml IV PRN PRN


   PRN Reason: LINE FLUSH


Tramadol HCl (Tramadol 50 Mg Tab)  50 mg PO Q4H PRN


   PRN Reason: Pain, Mild (1-3)











Physical Examination


Vital signs: 


                                   Vital Signs











Temp Pulse Resp BP Pulse Ox


 


 99.4 F   95 H  20   118/74   99 


 


 07/22/21 11:43  07/22/21 11:43  07/22/21 11:43  07/22/21 11:43  07/22/21 11:43














Results





- Laboratory Findings


CBC and BMP: 


                                 07/22/21 12:45





                                 07/23/21 05:25


PT/INR, D-dimer











PT  14.1 Sec. (12.2-14.9)   07/22/21  14:15    


 


INR  1.03  (0.87-1.13)   07/22/21  14:15    


 


D-Dimer  1805.96 ng/mlDDU (0-234)  H  07/22/21  14:15    








Abnormal lab findings: 


                                  Abnormal Labs











  07/22/21 07/22/21 07/22/21





  12:45 12:45 14:15


 


WBC  12.2 H  


 


RBC  3.61 L  


 


MCV  102 H  


 


MCH  36 H  


 


MCHC  35 H  


 


Seg Neutrophils %  76.5 H  


 


Seg Neutrophils #  9.4 H  


 


D-Dimer   


 


Heparin Anti-Xa Level   


 


Sodium   124 L 


 


Potassium   3.1 L 


 


Chloride   80.4 L 


 


Creatinine   1.6 H 


 


Calcium   


 


Troponin T   0.056 H  0.082 H D


 


Triglycerides   267 H 


 


Cholesterol   212 H 


 


LDL Cholesterol Direct   140 H 














  07/22/21 07/22/21 07/22/21





  14:15 18:37 20:32


 


WBC   


 


RBC   


 


MCV   


 


MCH   


 


MCHC   


 


Seg Neutrophils %   


 


Seg Neutrophils #   


 


D-Dimer  1805.96 H  


 


Heparin Anti-Xa Level   


 


Sodium   


 


Potassium   


 


Chloride   


 


Creatinine   


 


Calcium   


 


Troponin T   0.136 H* D  0.147 H*


 


Triglycerides   


 


Cholesterol   


 


LDL Cholesterol Direct   














  07/23/21 07/23/21 07/23/21





  05:25 05:25 05:25


 


WBC   


 


RBC   


 


MCV   


 


MCH   


 


MCHC   


 


Seg Neutrophils %   


 


Seg Neutrophils #   


 


D-Dimer   


 


Heparin Anti-Xa Level    0.11 L


 


Sodium   124 L 


 


Potassium   3.2 L 


 


Chloride   85.3 L 


 


Creatinine   


 


Calcium   8.1 L 


 


Troponin T  0.121 H*  


 


Triglycerides   


 


Cholesterol   


 


LDL Cholesterol Direct   














- Diagnostic Findings


CT scan - chest: image reviewed





Assessment and Plan





61 y/o male with chest pain, and shortness of breath found to have clean 

coronaries on heart cath with hyponatremia, hypochloremia, hypokalemia and known

 hypothyroid.





1.  Should be assessed outpatient for AQUILINO with official screening and if 

positive PSG


2.  Patient not on oxygen on admission, placed on 2 liters during cath and in 

the recovery area, should be able to wean off


3.  Labs don't shows a significantly elevated bicarb so not sure that patient 

has OHS, no indication to check ABG at this time unless patient becomes 

significantly hypoxic or mental status changes and he becomes more somnolent


4.  Reviewed CT of chest and no acute findings there that could explain chest 

paini


5.  May need to evaluate TSH and Free T4 levels.


6.  Electrolyte abnormalities per primary team.





Thank you for the consult, will be happy to follow up with him as an outpatient.

  All others per primary team.

## 2021-07-24 VITALS — DIASTOLIC BLOOD PRESSURE: 83 MMHG | SYSTOLIC BLOOD PRESSURE: 136 MMHG

## 2021-07-24 LAB
BASOPHILS # (AUTO): 0.1 K/MM3 (ref 0–0.1)
BASOPHILS NFR BLD AUTO: 0.8 % (ref 0–1.8)
BUN SERPL-MCNC: 11 MG/DL (ref 9–20)
BUN/CREAT SERPL: 9 %
CALCIUM SERPL-MCNC: 9.2 MG/DL (ref 8.4–10.2)
EOSINOPHIL # BLD AUTO: 0.1 K/MM3 (ref 0–0.4)
EOSINOPHIL NFR BLD AUTO: 0.8 % (ref 0–4.3)
HCT VFR BLD CALC: 33.9 % (ref 35.5–45.6)
HEMOLYSIS INDEX: 17
HGB BLD-MCNC: 11.6 GM/DL (ref 11.8–15.2)
LYMPHOCYTES # BLD AUTO: 1.9 K/MM3 (ref 1.2–5.4)
LYMPHOCYTES NFR BLD AUTO: 20.6 % (ref 13.4–35)
MCHC RBC AUTO-ENTMCNC: 34 % (ref 32–34)
MCV RBC AUTO: 101 FL (ref 84–94)
MONOCYTES # (AUTO): 0.9 K/MM3 (ref 0–0.8)
MONOCYTES % (AUTO): 9.4 % (ref 0–7.3)
PLATELET # BLD: 378 K/MM3 (ref 140–440)
RBC # BLD AUTO: 3.35 M/MM3 (ref 3.65–5.03)

## 2021-07-24 RX ADMIN — Medication SCH ML: at 09:02

## 2021-07-24 RX ADMIN — METOPROLOL TARTRATE SCH MG: 25 TABLET, FILM COATED ORAL at 09:01

## 2021-07-24 RX ADMIN — FAMOTIDINE SCH MG: 10 TABLET ORAL at 09:00

## 2021-07-24 NOTE — DISCHARGE SUMMARY
Providers





- Providers


Date of Admission: 


07/22/21 15:45





Date of discharge: 07/24/21


Attending physician: 


PHILIPPE ARTEAGA





                                        





07/22/21


Consult to Cardiac Rehabilitation [CONS] Routine 


   Reason For Exam: Phase I





07/22/21 15:45


Consult to Cardiology [CONS] Routine 


   Consulting Provider: CHRISTIANO GILLILAND


   Reason For Exam: angina/chf





07/23/21 12:02


Consult to Cardiac Rehabilitation [CONS] Routine 


   Reason For Exam: Cardiac Rehab Evaluation











Primary care physician: 


PRIMARY CARE MD








Hospitalization


Reason for admission: Chest pain, elevated troponin, shortness of breath.


Condition: Stable


Hospital course: 





60-year-old  male with history of hypertension hyperlipidemia 

obesity hypoventilation syndrome hypothyroidism for several months been having 

shortness of breath with exertion with PND.  Tried diuretics by primary care 

doctor without relief.  The patient also reported fatigue with exertion and 

chest pain the day PTA described as midsternal nonradiating abnormal.  The 

patient had troponin suggestive of non-STEMI.  Patient also has some renal 

insufficiency with elevated creatinine.  The patient was admitted with diagnosis

 of acute kidney injury secondary to vasomotor nephropathy, hyponatremia, 

hyperkalemia and NSTEMI.  Given patient symptomology, patient was taken to the 

cardiac Cath Lab which revealed left main patent LAD patent circumflex patent 

ramus patent RCA patent small PLV with an 80% lesion with normal LV function 

with normal left end-diastolic pressure.  Cardiology recommended pulmonary 

consultation.  Pulmonary recommended follow-up as an outpatient for AQUILINO with 

sleep study.  CT of chest with no acute findings.  Patient has a history of 

hypothyroidism with thyroid evaluation and thyroid functions were normal.  

Cardiology and pulmonary cleared the patient for discharge.  Dedicated discharge

 time 35 minutes.


Disposition: DC-01 TO HOME OR SELFCARE


Final Discharge Diagnosis (Prints w/discharge instructions): NSTEMI type II in 

the setting of acute kidney injury.  Acute kidney injury secondary to vasomotor 

nephropathy, hyponatremia





Core Measure Documentation





- Palliative Care


Palliative Care/ Comfort Measures: Not Applicable





- Core Measures


Any of the following diagnoses?: acute MI





- Acute MI Discharge Requirements


Aspirin at discharge: Yes


ACE/ARB for LVSD if EF <40%: Yes


Beta blocker at discharge: Yes


Statin for LDL = or >100 mg/dl on DC: Yes





Exam





- Constitutional


Vitals: 


                                        











Temp Pulse Resp BP Pulse Ox


 


 98.5 F   79   18   148/67   98 


 


 07/24/21 07:19  07/24/21 09:01  07/24/21 08:37  07/24/21 09:01  07/24/21 08:37











General appearance: Present: no acute distress, well-nourished





- EENT


Eyes: Present: PERRL


ENT: hearing intact, clear oral mucosa





- Neck


Neck: Present: supple, normal ROM





- Respiratory


Respiratory effort: normal


Respiratory: bilateral: CTA





- Cardiovascular


Heart Sounds: Present: S1 & S2.  Absent: rub, click





- Extremities


Extremities: pulses symmetrical, No edema


Peripheral Pulses: within normal limits





- Abdominal


General gastrointestinal: Present: soft, non-tender, non-distended, normal bowel

 sounds


Male genitourinary: Present: normal





- Integumentary


Integumentary: Present: clear, warm, dry





- Musculoskeletal


Musculoskeletal: gait normal, strength equal bilaterally





- Psychiatric


Psychiatric: appropriate mood/affect, intact judgment & insight





- Neurologic


Neurologic: CNII-XII intact, moves all extremities





Plan


Activity: advance as tolerated


Weight Bearing Status: Weight Bear as Tolerated


Follow up with: 


PRIMARY CARE,MD [Primary Care Provider] - 3-5 Days


Prescriptions: 


Aspirin [Aspirin BABY CHEW TAB] 81 mg PO QDAY #30 tab.chew


buPROPion HCl [Bupropion HCl ER] 150 mg PO BID #60


hydroCHLOROthiazide [HCTZ] 25 mg PO QDAY #30


ISOSORBIDE MONOnitrate [Imdur ER] 30 mg PO QDAY #30 tablet


AtorvaSTATin [Lipitor] 20 mg PO QHS #30 tablet


Metoprolol [Lopressor TAB] 50 mg PO BID #60 tablet


Losartan Potassium 100 mg PO QDAY #30


NIFEdipine [Nifedipine ER] 60 mg PO QDAY #30


Omeprazole 40 mg PO QAM #30


methIMAzole [Tapazole] 5 mg PO BID #60

## 2021-07-24 NOTE — PROGRESS NOTE
Assessment and Plan


60-year-old male with hypertension hyperlipidemia obesity hypoventilation 

syndrome PND orthopnea chest pain non-STEMI cardiac cath revealed nonobstructive

disease with normal LV function normal left end-diastolic pressure stop 

hydrochlorothiazide continue losartan stop nifedipine start beta-blocker Imdur 

aspirin and statin.  Left a message for the sister.  Patient is feeling much 

better normal LV function echocardiogram.








- Patient Problems


(1) Hypertension


Current Visit: Yes   Status: Chronic   


Qualifiers: 


   Hypertension type: primary hypertension   Qualified Code(s): I10 - Essential 

(primary) hypertension   





(2) Hyperlipemia, mixed


Current Visit: Yes   Status: Chronic   





(3) ISABELA (acute kidney injury)


Current Visit: Yes   Status: Acute   





(4) Angina at rest


Current Visit: Yes   Status: Acute   





(5) Diastolic CHF


Current Visit: Yes   Status: Acute   


Qualifiers: 


   Heart failure chronicity: acute   Qualified Code(s): I50.31 - Acute diastolic

(congestive) heart failure   





(6) Hypokalemia


Current Visit: Yes   Status: Acute   





(7) Hyponatremia


Current Visit: Yes   Status: Acute   





(8) NSTEMI (non-ST elevated myocardial infarction)


Current Visit: Yes   Status: Acute   





(9) Obesity hypoventilation syndrome


Current Visit: Yes   Status: Chronic   





Subjective


Date of service: 07/24/21


Principal diagnosis: sob


Interval history: 


Patient is feeling better shortness of breath has resolved no chest pain








Objective


                                   Vital Signs











  Temp Pulse Pulse Pulse Resp BP BP


 


 07/24/21 10:49   78     128/74 


 


 07/24/21 09:01   79     148/67 


 


 07/24/21 08:37    81  81  18  


 


 07/24/21 07:19  98.5 F  73    19  140/88 


 


 07/24/21 05:00   46 L     


 


 07/24/21 04:00  97.7 F  65    18  133/81 


 


 07/23/21 23:09  98.0 F  70    19  130/86 


 


 07/23/21 21:41   92 H     130/76 


 


 07/23/21 21:00   94 H     


 


 07/23/21 19:07  97.8 F  94 H    18  130/76 


 


 07/23/21 14:00  97.9 F  75    16   126/77


 


 07/23/21 13:30  98.0 F  81    18   130/67


 


 07/23/21 13:00  98.2 F  81    16   128/74


 


 07/23/21 12:45  98.1 F  72    15   130/75


 


 07/23/21 12:35  98.2 F  95 H    18  131/93 


 


 07/23/21 12:31  98.4 F  88    17   133/85














  Pulse Ox


 


 07/24/21 10:49 


 


 07/24/21 09:01 


 


 07/24/21 08:37  98


 


 07/24/21 07:19  96


 


 07/24/21 05:00 


 


 07/24/21 04:00  93


 


 07/23/21 23:09  95


 


 07/23/21 21:41 


 


 07/23/21 21:00 


 


 07/23/21 19:07  94


 


 07/23/21 14:00  98


 


 07/23/21 13:30  97


 


 07/23/21 13:00  98


 


 07/23/21 12:45  97


 


 07/23/21 12:35  100


 


 07/23/21 12:31  97














- Physical Examination


HEENT: Positive: PERRL, Mucus Membranes Moist


Neck: Positive: neck supple, trachea midline


Cardiac: Positive: Reg Rate and Rhythm


Lungs: Positive: clear to auscultation


Neuro: Positive: Grossly Intact


Abdomen: Positive: Soft, Active Bowel Sounds.  Negative: Tender, Distended


Skin: Positive: Clear


Incision: Cardiac Cath Site


Musculoskeletal: No Pain, Normal Range of Motion


Extremities: Present: normal.  Absent: edema





- Labs and Meds


                                       CBC











  07/24/21 Range/Units





  04:26 


 


WBC  9.1  (4.5-11.0)  K/mm3


 


RBC  3.35 L  (3.65-5.03)  M/mm3


 


Hgb  11.6 L  (11.8-15.2)  gm/dl


 


Hct  33.9 L  (35.5-45.6)  %


 


Plt Count  378  (140-440)  K/mm3


 


Lymph # (Auto)  1.9  (1.2-5.4)  K/mm3


 


Mono # (Auto)  0.9 H  (0.0-0.8)  K/mm3


 


Eos # (Auto)  0.1  (0.0-0.4)  K/mm3


 


Baso # (Auto)  0.1  (0.0-0.1)  K/mm3








                          Comprehensive Metabolic Panel











  07/24/21 Range/Units





  04:26 


 


Sodium  130 L  (137-145)  mmol/L


 


Potassium  3.8  (3.6-5.0)  mmol/L


 


Chloride  89.8 L  ()  mmol/L


 


Carbon Dioxide  29  (22-30)  mmol/L


 


BUN  11  (9-20)  mg/dL


 


Creatinine  1.2  (0.8-1.3)  mg/dL


 


Glucose  109 H  ()  mg/dL


 


Calcium  9.2  (8.4-10.2)  mg/dL














- Imaging and Cardiology


Echo: report reviewed (Normal LV function no significant regurgitation)


Cardiac cath: report reviewed (left main patent LAD patent circumflex patent 

ramus patent RCA patent small PLV with an 80% lesion with normal LV function 

with normal left end-diastolic pressure.)





- Telemetry


EKG Rhythm: Sinus Rhythm

## 2021-07-28 ENCOUNTER — OFFICE VISIT (OUTPATIENT)
Dept: URBAN - METROPOLITAN AREA CLINIC 109 | Facility: CLINIC | Age: 61
End: 2021-07-28

## 2021-08-31 ENCOUNTER — OFFICE VISIT (OUTPATIENT)
Dept: URBAN - METROPOLITAN AREA CLINIC 40 | Facility: CLINIC | Age: 61
End: 2021-08-31

## 2025-06-16 ENCOUNTER — TRANSCRIBE ORDERS (OUTPATIENT)
Facility: HOSPITAL | Age: 65
End: 2025-06-16

## 2025-06-16 DIAGNOSIS — N18.31 CHRONIC KIDNEY DISEASE (CKD) STAGE G3A/A1, MODERATELY DECREASED GLOMERULAR FILTRATION RATE (GFR) BETWEEN 45-59 ML/MIN/1.73 SQUARE METER AND ALBUMINURIA CREATININE RATIO LESS THAN 30 MG/G (HCC): Primary | ICD-10-CM

## 2025-06-23 ENCOUNTER — HOSPITAL ENCOUNTER (OUTPATIENT)
Facility: HOSPITAL | Age: 65
Discharge: HOME | End: 2025-06-26
Attending: INTERNAL MEDICINE
Payer: MEDICARE

## 2025-06-23 DIAGNOSIS — N18.31 CHRONIC KIDNEY DISEASE (CKD) STAGE G3A/A1, MODERATELY DECREASED GLOMERULAR FILTRATION RATE (GFR) BETWEEN 45-59 ML/MIN/1.73 SQUARE METER AND ALBUMINURIA CREATININE RATIO LESS THAN 30 MG/G (HCC): ICD-10-CM

## 2025-06-23 PROCEDURE — 76770 US EXAM ABDO BACK WALL COMP: CPT
